# Patient Record
Sex: MALE | Race: WHITE
[De-identification: names, ages, dates, MRNs, and addresses within clinical notes are randomized per-mention and may not be internally consistent; named-entity substitution may affect disease eponyms.]

---

## 2017-04-06 ENCOUNTER — HOSPITAL ENCOUNTER (EMERGENCY)
Dept: HOSPITAL 62 - ER | Age: 37
LOS: 1 days | Discharge: HOME | End: 2017-04-07
Payer: OTHER GOVERNMENT

## 2017-04-06 DIAGNOSIS — Z88.0: ICD-10-CM

## 2017-04-06 DIAGNOSIS — Z88.2: ICD-10-CM
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
## 2017-04-06 DIAGNOSIS — Z88.8: ICD-10-CM
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
## 2017-04-06 DIAGNOSIS — S06.0X0A: Primary | ICD-10-CM
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
## 2017-04-06 DIAGNOSIS — Z88.1: ICD-10-CM

## 2017-04-06 DIAGNOSIS — Z88.5: ICD-10-CM

## 2017-04-06 PROCEDURE — 70450 CT HEAD/BRAIN W/O DYE: CPT

## 2017-04-06 PROCEDURE — 70486 CT MAXILLOFACIAL W/O DYE: CPT

## 2017-04-06 PROCEDURE — 99284 EMERGENCY DEPT VISIT MOD MDM: CPT

## 2017-04-07 VITALS — DIASTOLIC BLOOD PRESSURE: 81 MMHG | SYSTOLIC BLOOD PRESSURE: 120 MMHG

## 2017-04-07 NOTE — ER DOCUMENT REPORT
ED General





- General


Chief Complaint: Head Injury


Stated Complaint: HEAD INJURY


Notes: 


Patient is a 36-year-old male presents with complaint of being hit in head with 

base wall.  He was pitching to player hit a line drive and hit him directly in 

the left side of face and temple portion of his head.  He did not black out but 

became very days.  He's been holding compression and ice to the area since and.

  Hurts to open his left jaw.  He had some blurred type vision.  He is not on 

blood thinners.  No other injuries.  No neck pain.  No other complaints at this 

time.





TRAVEL OUTSIDE OF THE [place] IN LAST 30 DAYS: No





- Related Data


Allergies/Adverse Reactions: 


 





ketorolac tromethamine [From Toradol] Allergy (Verified 04/06/17 23:21)


 


mesalamine [From Pentasa] Allergy (Verified 04/06/17 23:21)


 


metronidazole [From Flagyl] Allergy (Verified 04/06/17 23:21)


 


Metronidazole HCl [From Flagyl] Allergy (Verified 04/06/17 23:21)


 


morphine [Morphine] Allergy (Verified 04/06/17 23:21)


 


Penicillins Allergy (Verified 04/06/17 23:21)


 


propranolol [Propranolol] Allergy (Verified 04/06/17 23:21)


 


Sulfa (Sulfonamide Antibiotics) Allergy (Verified 04/06/17 23:21)


 


tramadol [Tramadol] Allergy (Verified 04/06/17 23:21)


 











Past Medical History





- Social History


Smoking Status: Never Smoker


Frequency of alcohol use: None


Drug Abuse: None


Family History: Reviewed & Not Pertinent


Patient has suicidal ideation: No


Patient has homicidal ideation: No


Renal/ Medical History: Reports: Hx Kidney Stones.  Denies: Hx Peritoneal 

Dialysis


GI Medical History: Reports: Hx Crohn's Disease, Hx Gastroesophageal Reflux 

Disease, Hx Hiatal Hernia


Past Surgical History: Reports: Hx Abdominal Surgery, Hx Bowel Surgery, Hx 

Cholecystectomy, Hx Rectal Surgery





- Immunizations


Hx Diphtheria, Pertussis, Tetanus Vaccination: Yes





Review of Systems





- Review of Systems


Notes: 


My Normal Review Basic





REVIEW OF SYSTEMS:


CONSTITUTIONAL :  Denies fever,  chills, or sweats.  Denies recent illness.


EENT: Pain to left side of face.


GASTROINTESTINAL:  Denies abdominal pain.  Denies nausea, vomiting, or 

diarrhea.  Denies constipation.  Last BM: 


MUSCULOSKELETAL:  Denies neck or back pain or joint pain or swelling.


SKIN:   Denies rash or skin lesions.


NEUROLOGICAL:  Denies altered mental status or loss of consciousness.  Has a 

headache.  Denies weakness or paralysis or loss of use of either side.  Denies 

problems with gait or speech.  Denies sensory or motor loss.


ALL OTHER SYSTEMS REVIEWED AND NEGATIVE.





Physical Exam





- Vital signs


Vitals: 


 











Temp Pulse Resp BP Pulse Ox


 


 98.4 F   116 H  18   127/90 H  100 


 


 04/06/17 23:13  04/06/17 23:13  04/06/17 23:13  04/06/17 23:13  04/06/17 23:13














- Notes


Notes: 


General Appearance: Well nourished, alert, cooperative, no acute distress, 

moderate obvious discomfort.


Vitals: reviewed, See vital signs table.


Head: no swelling or tenderness to the head


Eyes: PERRL, EOMI, Conjuctiva clear


Mouth: No decreasd moisture


Throat: No tonsillar inflammation, No airway obstruction,  No lymphadenopathy


Neck: Supple, no neck tenderness, 


Extremities: strength 5/5 in all extremities, good pulses in all extremities, 

no swelling or tenderness in the extremities, no edema.


Skin: warm, dry, appropriate color, no rash


Neuro: speech clear, oriented x 3, normal affect, responds appropriately to 

questions.  Cranial nerves II through XII are intact.  Distal sensation intact.

  Patient moves all extremities without difficulty.





Course





- Vital Signs


Vital signs: 


 











Temp Pulse Resp BP Pulse Ox


 


 97.9 F   81   16   120/81   97 


 


 04/07/17 02:38  04/07/17 02:38  04/07/17 02:38  04/07/17 02:38  04/07/17 02:38














- Transfer of Care


Notes: 


04/07/17 05:59


CT scan showed no evidence of skull fracture bleeding in the brain or facial 

fracture.  I informed patient most likely suffered concussion.  We'll have him 

avoid any type of activities that could potentially and physical contact for at 

least 2 weeks.  Patient encouraged return to ER immediately if he has severe 

headache, vomiting, or feels unwell.  Patient agrees with plan and will be 

discharged home.





Dictation of this chart was performed using voice recognition software; 

therefore, there may be some unintended grammatical errors.





04/07/17 05:59








Discharge





- Discharge


Clinical Impression: 


Concussion


Qualifiers:


 Encounter type: initial encounter Loss of consciousness presence/duration: 

without LOC Qualified Code(s): S06.0X0A - Concussion without loss of 

consciousness, initial encounter





Condition: Good


Disposition: HOME, SELF-CARE


Instructions:  Oral Narcotic Medication ([facility])


Additional Instructions: 


Concussion





     You have suffered a concussion -- a temporary loss of certain brain 

functions due to a mild brain injury.  The recovery is usually rapid and 

complete.  The temporary problems occurring with a concussion can include loss 

of consciousness, dizziness, nausea, vomiting, and confusion.


     Repeat concussions can cause brain damage. In the future, avoid activities 

that will cause a blow to your head. Wear a helmet for sports such as 

snowboarding, biking, or skating.


     It's important that someone be with you for the first 24 hours. During 

this time, do not exercise or drive a vehicle.  Do not take any pain medication 

stronger than acetaminophen unless prescribed by the physician.  Any 

significant changes should be reported immediately to the physician.  Signs of 

a problem may include:





     (1) Mental confusion


     (2) Incoordination or staggering


     (3) Repeated or forceful vomiting


     (4) Clear or bloody drainage from ear, mouth, or nose


     (5) Severe headache, not relieved by acetaminophen or prescribed pain 

medication


     (6) Failure to improve in 24 hours








Please return to the ER immediately if you have intractable vomiting, worsening 

headache, or severe sleepiness. Please follow up closely with your doctor for 

reevaluation on Monday. Avoid any activities that could cause potential contact 

or trauma to your head for at least 2 weeks.


Referrals: 


SARITHA HOLT MD [Primary Care Provider] - 04/10/17

## 2017-10-14 ENCOUNTER — HOSPITAL ENCOUNTER (EMERGENCY)
Dept: HOSPITAL 62 - ER | Age: 37
LOS: 1 days | Discharge: HOME | End: 2017-10-15
Payer: MEDICARE

## 2017-10-14 DIAGNOSIS — Z88.0: ICD-10-CM

## 2017-10-14 DIAGNOSIS — Z88.8: ICD-10-CM

## 2017-10-14 DIAGNOSIS — Z48.815: Primary | ICD-10-CM

## 2017-10-14 DIAGNOSIS — Z90.49: ICD-10-CM

## 2017-10-14 DIAGNOSIS — Z88.5: ICD-10-CM

## 2017-10-14 DIAGNOSIS — Z88.2: ICD-10-CM

## 2017-10-14 DIAGNOSIS — Z88.1: ICD-10-CM

## 2017-10-14 LAB
BASOPHILS # BLD AUTO: 0.1 10^3/UL (ref 0–0.2)
BASOPHILS NFR BLD AUTO: 1.4 % (ref 0–2)
EOSINOPHIL # BLD AUTO: 0.1 10^3/UL (ref 0–0.6)
EOSINOPHIL NFR BLD AUTO: 1 % (ref 0–6)
ERYTHROCYTE [DISTWIDTH] IN BLOOD BY AUTOMATED COUNT: 12.4 % (ref 11.5–14)
HCT VFR BLD CALC: 37.3 % (ref 37.9–51)
HGB BLD-MCNC: 13 G/DL (ref 13.5–17)
HGB HCT DIFFERENCE: 1.7
LYMPHOCYTES # BLD AUTO: 1.9 10^3/UL (ref 0.5–4.7)
LYMPHOCYTES NFR BLD AUTO: 20.3 % (ref 13–45)
MCH RBC QN AUTO: 30.5 PG (ref 27–33.4)
MCHC RBC AUTO-ENTMCNC: 34.8 G/DL (ref 32–36)
MCV RBC AUTO: 88 FL (ref 80–97)
MONOCYTES # BLD AUTO: 0.8 10^3/UL (ref 0.1–1.4)
MONOCYTES NFR BLD AUTO: 8.2 % (ref 3–13)
NEUTROPHILS # BLD AUTO: 6.5 10^3/UL (ref 1.7–8.2)
NEUTS SEG NFR BLD AUTO: 69.1 % (ref 42–78)
PROTHROMBIN TIME: 12.1 SEC (ref 11.4–15.4)
RBC # BLD AUTO: 4.26 10^6/UL (ref 4.35–5.55)
WBC # BLD AUTO: 9.4 10^3/UL (ref 4–10.5)

## 2017-10-14 PROCEDURE — 87086 URINE CULTURE/COLONY COUNT: CPT

## 2017-10-14 PROCEDURE — 82803 BLOOD GASES ANY COMBINATION: CPT

## 2017-10-14 PROCEDURE — 93005 ELECTROCARDIOGRAM TRACING: CPT

## 2017-10-14 PROCEDURE — 99284 EMERGENCY DEPT VISIT MOD MDM: CPT

## 2017-10-14 PROCEDURE — 81001 URINALYSIS AUTO W/SCOPE: CPT

## 2017-10-14 PROCEDURE — 85025 COMPLETE CBC W/AUTO DIFF WBC: CPT

## 2017-10-14 PROCEDURE — 85610 PROTHROMBIN TIME: CPT

## 2017-10-14 PROCEDURE — 36415 COLL VENOUS BLD VENIPUNCTURE: CPT

## 2017-10-14 PROCEDURE — 93010 ELECTROCARDIOGRAM REPORT: CPT

## 2017-10-14 PROCEDURE — 80053 COMPREHEN METABOLIC PANEL: CPT

## 2017-10-14 PROCEDURE — 82962 GLUCOSE BLOOD TEST: CPT

## 2017-10-14 PROCEDURE — 83605 ASSAY OF LACTIC ACID: CPT

## 2017-10-14 PROCEDURE — 74177 CT ABD & PELVIS W/CONTRAST: CPT

## 2017-10-15 VITALS — SYSTOLIC BLOOD PRESSURE: 118 MMHG | DIASTOLIC BLOOD PRESSURE: 72 MMHG

## 2017-10-15 LAB
ALBUMIN SERPL-MCNC: 4.5 G/DL (ref 3.5–5)
ALP SERPL-CCNC: 228 U/L (ref 38–126)
ALT SERPL-CCNC: 74 U/L (ref 21–72)
ANION GAP SERPL CALC-SCNC: 15 MMOL/L (ref 5–19)
APPEARANCE UR: CLEAR
AST SERPL-CCNC: 39 U/L (ref 17–59)
BILIRUB DIRECT SERPL-MCNC: 0.4 MG/DL (ref 0–0.4)
BILIRUB SERPL-MCNC: 0.4 MG/DL (ref 0.2–1.3)
BILIRUB UR QL STRIP: NEGATIVE
BUN SERPL-MCNC: 12 MG/DL (ref 7–20)
CALCIUM: 10.5 MG/DL (ref 8.4–10.2)
CHLORIDE SERPL-SCNC: 102 MMOL/L (ref 98–107)
CO2 SERPL-SCNC: 24 MMOL/L (ref 22–30)
CREAT SERPL-MCNC: 0.92 MG/DL (ref 0.52–1.25)
GLUCOSE SERPL-MCNC: 94 MG/DL (ref 75–110)
GLUCOSE UR STRIP-MCNC: NEGATIVE MG/DL
KETONES UR STRIP-MCNC: NEGATIVE MG/DL
NITRITE UR QL STRIP: NEGATIVE
PH UR STRIP: 5 [PH] (ref 5–9)
POTASSIUM SERPL-SCNC: 5 MMOL/L (ref 3.6–5)
PROT SERPL-MCNC: 7.5 G/DL (ref 6.3–8.2)
PROT UR STRIP-MCNC: NEGATIVE MG/DL
SODIUM SERPL-SCNC: 141.2 MMOL/L (ref 137–145)
SP GR UR STRIP: 1.02
UROBILINOGEN UR-MCNC: NEGATIVE MG/DL (ref ?–2)

## 2017-10-15 NOTE — RADIOLOGY REPORT (SQ)
EXAM DESCRIPTION:  CT ABD/PELVIS WITH IV   ORAL



COMPLETED DATE/TIME:  10/15/2017 1:48 am



REASON FOR STUDY:  post op pain, superficial drainage



COMPARISON:  3/14/2016



TECHNIQUE:  CT scan of the abdomen and pelvis performed with intravenous and oral contrast using lakshmi
shaina scanning technique with dynamic intravenous contrast injection. Images reviewed with lung, soft t
issue, and bone windows. Reconstructed coronal and sagittal MPR images reviewed. Delayed images for e
valuation of the urinary system also acquired. All images stored on PACS.

All CT scanners at this facility use dose modulation, iterative reconstruction, and/or weight based d
osing when appropriate to reduce radiation dose to as low as reasonably achievable (ALARA).

[facility]: Dose Right  [facility]: CareDose    [facility]: Dose Right    [facility]: Teradose 4D    [facility]: Smart Technologies



CONTRAST TYPE AND DOSE:  contrast/concentration: Isovue 370.00 mg/ml; Total Contrast Delivered: 94.0 
ml; Total Saline Delivered: 71.0 ml



RENAL FUNCTION:  GFR > 60.



RADIATION DOSE:  Up-to-date CT equipment and radiation dose reduction techniques were employed. CTDIv
ol: 12.0 - 16.1 mGy. DLP: 1585 mGy-cm. .



LIMITATIONS:  None.



FINDINGS:  LOWER CHEST: Basilar atelectasis.

LIVER: Normal size. No masses.  No dilated ducts.

SPLEEN: Normal size. No focal lesions.

PANCREAS: No masses. No significant calcifications. No adjacent inflammation or peripancreatic fluid 
collections. Pancreatic duct not dilated.

GALLBLADDER: No identified stones by CT criteria. No inflammatory changes to suggest cholecystitis.

ADRENAL GLANDS: No significant masses or asymmetry.

RIGHT KIDNEY AND URETER: No solid masses.   No significant calcifications.   No hydronephrosis or hyd
roureter.

LEFT KIDNEY AND URETER: No solid masses.   No significant calcifications.   No hydronephrosis or hydr
oureter.

AORTA AND VESSELS: No aneurysm. No dissection. Renal arteries, SMA, celiac without stenosis.

RETROPERITONEUM: No retroperitoneal adenopathy, hemorrhage or masses.

BOWEL AND PERITONEAL CAVITY: Colectomy.  Left lower quadrant ostomy.  Mild adynamic ileus.  No visual
ized abscess.

APPENDIX: Surgically absent.

PELVIS: No significant masses.  Normal bladder. No free fluid.

ABDOMINAL WALL: Midline surgical incision.  No fluid collection.

BONES: No significant or acute findings.

OTHER: No other significant finding.



IMPRESSION:  Postsurgical changes with colectomy.  Left lower quadrant ostomy.  No abscess.  Mild julisa
namic ileus.

Midline surgical incision without focal fluid collection.



TECHNICAL DOCUMENTATION:  JOB ID:  [id number]

Quality ID # [id number]: Final reports with documentation of one or more dose reduction techniques (e.g., Au
tomated exposure control, adjustment of the mA and/or kV according to patient size, use of iterative 
reconstruction technique)

 2011 [employer]- All Rights Reserved

## 2017-10-15 NOTE — ER DOCUMENT REPORT
ED General





- General


Chief Complaint: Wound Infection


Stated Complaint: POST SURGERY PROBLEMS


Time Seen by Provider: 10/14/17 23:41


Notes: 





Patient is a 37-year-old male who presents emergency department  for wound 

check.  Patient states that he had  surgery on September 27 for reversal of 

previous ileoanal ostomy.  Patient states that he has a history of Crohn's 

disease that is previously required total colectomy, loop ileostomy, ileoanal 

anastomosis with recent reversal for an ileostomy.  Patient states that he was 

in the hospital for approximately 2 weeks with noted complication of ileus.  

Otherwise he states that he was discharged home to do wet-to-dry dressings of 

his abdominal incision that had evidence of wound dehiscence.  Patient states 

that he has been doing wet-to-dry dressings every 12 hours.  States this 

evening she had an area that he was concerned was pus.  Otherwise he denies any 

fevers, chills, nausea, vomiting, abdominal pain.


TRAVEL OUTSIDE OF THE [place] IN LAST 30 DAYS: No





- Related Data


Allergies/Adverse Reactions: 


 





ketorolac tromethamine [From Toradol] Allergy (Verified 04/06/17 23:21)


 


mesalamine [From Pentasa] Allergy (Verified 04/06/17 23:21)


 


metronidazole [From Flagyl] Allergy (Verified 04/06/17 23:21)


 


Metronidazole HCl [From Flagyl] Allergy (Verified 04/06/17 23:21)


 


morphine [Morphine] Allergy (Verified 04/06/17 23:21)


 


Penicillins Allergy (Verified 04/06/17 23:21)


 


propranolol [Propranolol] Allergy (Verified 04/06/17 23:21)


 


Sulfa (Sulfonamide Antibiotics) Allergy (Verified 04/06/17 23:21)


 


tramadol [Tramadol] Allergy (Verified 04/06/17 23:21)


 











Past Medical History





- Social History


Smoking Status: Never Smoker


Family History: Reviewed & Not Pertinent


Patient has suicidal ideation: No


Patient has homicidal ideation: No


Renal/ Medical History: Reports: Hx Kidney Stones.  Denies: Hx Peritoneal 

Dialysis


GI Medical History: Reports: Hx Crohn's Disease, Hx Gastroesophageal Reflux 

Disease, Hx Hiatal Hernia


Past Surgical History: Reports: Hx Abdominal Surgery, Hx Bowel Surgery, Hx 

Cholecystectomy, Hx Rectal Surgery





- Immunizations


Hx Diphtheria, Pertussis, Tetanus Vaccination: Yes





Review of Systems





- Review of Systems


Constitutional: No symptoms reported


Cardiovascular: No symptoms reported


Respiratory: No symptoms reported


Gastrointestinal: See HPI





Physical Exam





- Vital signs


Vitals: 


 











Temp Pulse Resp BP Pulse Ox


 


 98.7 F   90   20   134/80 H  95 


 


 10/14/17 22:39  10/14/17 22:39  10/14/17 22:39  10/14/17 22:39  10/14/17 22:39














- Notes


Notes: 





PHYSICAL EXAM


GENERAL: Alert, interacts well. 


HEAD: Normocephalic, atraumatic.


EYES: Pupils equal, round, and reactive to light. Extraocular movements intact.


ENT: Oral mucosa moist, tongue midline. 


NECK: Full range of motion. Supple. Trachea midline.


LUNGS: Clear to auscultation bilaterally, no wheezes, rales, or rhonchi. No 

respiratory distress.


HEART: Regular rate and rhythm. No murmurs, gallops, or rubs.


ABDOMEN: Soft,  nondistended, nontender. No guarding, rebound, or rigidity.. 

Bowel sounds present in all 4 quadrants.


EXTREMITIES: Moves all 4 extremities spontaneously. No edema, radial and 

dorsalis pedis pulses 2/4 bilaterally. No cyanosis. 


NEUROLOGICAL: Alert and oriented x4. Normal speech.


PSYCH: Normal affect, normal mood.


SKIN: Warm, dry, normal turgor.  Midline abdominal laparotomy incision with 

staples and central to the incision is an area approximately 3 cm long and wide 

with packing.  Approximately 3 cm cephalic to this area is an area lateral to 

the incision that when pressed has clear serous fluid.  No surrounding erythema

, induration.








Course





- Re-evaluation


Re-evalutation: 





10/15/17 03:47


Patient is a 37-year-old male who is hemodynamically stable, no acute distress 

afebrile.  Given previous abdominal surgeries, complications and presentation 

this evening CT scan was ordered to rule out any concern for deeper abscess.  

There is no evidence of this on scan.  Discussion with resident on-call [facility] agrees with plan for repacking of the site and to withhold 

antibiotics given no signs of fever, elevated white blood cell count or 

cellulitis.  2 staples removed from the incision to allow for continued 

drainage of this area.  Patient tolerated the procedure well.  Stable for 

discharge home and to touch base with the surgeon's office on Monday.





- Vital Signs


Vital signs: 


 











Temp Pulse Resp BP Pulse Ox


 


 98.7 F   79   16   118/72   95 


 


 10/15/17 05:21  10/15/17 05:21  10/15/17 05:21  10/15/17 05:21  10/15/17 05:21














- Laboratory


Result Diagrams: 


 10/14/17 23:46





 10/14/17 23:46


Laboratory results interpreted by me: 


 











  10/14/17 10/14/17 10/15/17





  23:46 23:46 01:20


 


RBC  4.26 L  


 


Hgb  13.0 L  


 


Hct  37.3 L  


 


Plt Count  495 H  


 


Calcium   10.5 H 


 


ALT   74 H 


 


Alkaline Phosphatase   228 H 


 


Urine Blood    SMALL H














- Diagnostic Test


Radiology reviewed: Image reviewed, Reports reviewed





Discharge





- Discharge


Clinical Impression: 


 Visit for wound check





Condition: Good


Disposition: HOME, SELF-CARE


Additional Instructions: 


Please continue to care for your wound as you have been instructed by [facility].


Please be sure to touch base with your surgeon on Monday


Referrals: 


JUNAID JAMES NP [Primary Care Provider] - Follow up as needed

## 2020-12-23 ENCOUNTER — HOSPITAL ENCOUNTER (EMERGENCY)
Dept: HOSPITAL 62 - ER | Age: 40
LOS: 1 days | Discharge: ANOTHER HOSPITAL | End: 2020-12-24
Payer: MEDICARE

## 2020-12-23 DIAGNOSIS — R10.84: ICD-10-CM

## 2020-12-23 DIAGNOSIS — Z79.899: ICD-10-CM

## 2020-12-23 DIAGNOSIS — Z88.1: ICD-10-CM

## 2020-12-23 DIAGNOSIS — Z88.6: ICD-10-CM

## 2020-12-23 DIAGNOSIS — Z88.2: ICD-10-CM

## 2020-12-23 DIAGNOSIS — R11.0: ICD-10-CM

## 2020-12-23 DIAGNOSIS — K50.812: Primary | ICD-10-CM

## 2020-12-23 DIAGNOSIS — Z20.828: ICD-10-CM

## 2020-12-23 DIAGNOSIS — Z88.8: ICD-10-CM

## 2020-12-23 DIAGNOSIS — K76.0: ICD-10-CM

## 2020-12-23 DIAGNOSIS — Z72.0: ICD-10-CM

## 2020-12-23 DIAGNOSIS — Z93.2: ICD-10-CM

## 2020-12-23 DIAGNOSIS — K43.3: ICD-10-CM

## 2020-12-23 DIAGNOSIS — Z88.0: ICD-10-CM

## 2020-12-23 DIAGNOSIS — Z88.5: ICD-10-CM

## 2020-12-23 DIAGNOSIS — R10.817: ICD-10-CM

## 2020-12-23 LAB
ADD MANUAL DIFF: NO
ALBUMIN SERPL-MCNC: 4.3 G/DL (ref 3.5–5)
ALP SERPL-CCNC: 72 U/L (ref 38–126)
ANION GAP SERPL CALC-SCNC: 10 MMOL/L (ref 5–19)
APPEARANCE UR: CLEAR
APTT PPP: YELLOW S
AST SERPL-CCNC: 42 U/L (ref 17–59)
BASOPHILS # BLD AUTO: 0.1 10^3/UL (ref 0–0.2)
BASOPHILS NFR BLD AUTO: 0.6 % (ref 0–2)
BILIRUB DIRECT SERPL-MCNC: 0.1 MG/DL (ref 0–0.4)
BILIRUB SERPL-MCNC: 0.5 MG/DL (ref 0.2–1.3)
BILIRUB UR QL STRIP: NEGATIVE
BUN SERPL-MCNC: 10 MG/DL (ref 7–20)
CALCIUM: 9.7 MG/DL (ref 8.4–10.2)
CHLORIDE SERPL-SCNC: 105 MMOL/L (ref 98–107)
CO2 SERPL-SCNC: 23 MMOL/L (ref 22–30)
EOSINOPHIL # BLD AUTO: 0.1 10^3/UL (ref 0–0.6)
EOSINOPHIL NFR BLD AUTO: 0.6 % (ref 0–6)
ERYTHROCYTE [DISTWIDTH] IN BLOOD BY AUTOMATED COUNT: 13.6 % (ref 11.5–14)
GLUCOSE SERPL-MCNC: 104 MG/DL (ref 75–110)
GLUCOSE UR STRIP-MCNC: NEGATIVE MG/DL
HCT VFR BLD CALC: 43.9 % (ref 37.9–51)
HGB BLD-MCNC: 15.2 G/DL (ref 13.5–17)
KETONES UR STRIP-MCNC: NEGATIVE MG/DL
LYMPHOCYTES # BLD AUTO: 1.5 10^3/UL (ref 0.5–4.7)
LYMPHOCYTES NFR BLD AUTO: 15.6 % (ref 13–45)
MCH RBC QN AUTO: 30.4 PG (ref 27–33.4)
MCHC RBC AUTO-ENTMCNC: 34.6 G/DL (ref 32–36)
MCV RBC AUTO: 88 FL (ref 80–97)
MONOCYTES # BLD AUTO: 0.7 10^3/UL (ref 0.1–1.4)
MONOCYTES NFR BLD AUTO: 6.8 % (ref 3–13)
NEUTROPHILS # BLD AUTO: 7.4 10^3/UL (ref 1.7–8.2)
NEUTS SEG NFR BLD AUTO: 76.4 % (ref 42–78)
NITRITE UR QL STRIP: NEGATIVE
PH UR STRIP: 5 [PH] (ref 5–9)
PLATELET # BLD: 195 10^3/UL (ref 150–450)
POTASSIUM SERPL-SCNC: 3.9 MMOL/L (ref 3.6–5)
PROT SERPL-MCNC: 7.1 G/DL (ref 6.3–8.2)
PROT UR STRIP-MCNC: NEGATIVE MG/DL
RBC # BLD AUTO: 4.99 10^6/UL (ref 4.35–5.55)
SP GR UR STRIP: 1.03
TOTAL CELLS COUNTED % (AUTO): 100 %
UROBILINOGEN UR-MCNC: NEGATIVE MG/DL (ref ?–2)
WBC # BLD AUTO: 9.6 10^3/UL (ref 4–10.5)

## 2020-12-23 PROCEDURE — 80053 COMPREHEN METABOLIC PANEL: CPT

## 2020-12-23 PROCEDURE — C9803 HOPD COVID-19 SPEC COLLECT: HCPCS

## 2020-12-23 PROCEDURE — 83690 ASSAY OF LIPASE: CPT

## 2020-12-23 PROCEDURE — 96376 TX/PRO/DX INJ SAME DRUG ADON: CPT

## 2020-12-23 PROCEDURE — 81001 URINALYSIS AUTO W/SCOPE: CPT

## 2020-12-23 PROCEDURE — 85025 COMPLETE CBC W/AUTO DIFF WBC: CPT

## 2020-12-23 PROCEDURE — 0241U: CPT

## 2020-12-23 PROCEDURE — 96361 HYDRATE IV INFUSION ADD-ON: CPT

## 2020-12-23 PROCEDURE — 36415 COLL VENOUS BLD VENIPUNCTURE: CPT

## 2020-12-23 PROCEDURE — 74177 CT ABD & PELVIS W/CONTRAST: CPT

## 2020-12-23 PROCEDURE — 96375 TX/PRO/DX INJ NEW DRUG ADDON: CPT

## 2020-12-23 PROCEDURE — 99285 EMERGENCY DEPT VISIT HI MDM: CPT

## 2020-12-23 PROCEDURE — 96374 THER/PROPH/DIAG INJ IV PUSH: CPT

## 2020-12-23 NOTE — ER DOCUMENT REPORT
ED General





- General


Chief Complaint: Abdominal Pain


Stated Complaint: LOWER ABDOMINAL PAIN


Time Seen by Provider: 12/23/20 18:22


Primary Care Provider: 


JUNAID JAMES NP [NURSE PRACTITIONER] - Follow up as needed


Notes: 





40-year-old male with history of Crohn's disease with 28 prior abdominal 

surgeries for multiple ostomies and reversals and ultimately end ileostomy 

presents with severe abdominal pain diffusely started approximately 6 hours 

prior to arrival associated with nausea and no output from ostomy bag.  Patient 

denies black stool, bloody stool, vomiting, fever, chest pain, trauma, 

dizziness, syncope


TRAVEL OUTSIDE OF THE [place] IN LAST 30 DAYS: No





- Related Data


Allergies/Adverse Reactions: 


                                        





ketorolac tromethamine [From Toradol] Allergy (Verified 04/06/17 23:21)


   


mesalamine [From Pentasa] Allergy (Verified 04/06/17 23:21)


   


metronidazole [From Flagyl] Allergy (Verified 04/06/17 23:21)


   


Metronidazole HCl [From Flagyl] Allergy (Verified 04/06/17 23:21)


   


morphine [Morphine] Allergy (Verified 04/06/17 23:21)


   


Penicillins Allergy (Verified 04/06/17 23:21)


   


propranolol [Propranolol] Allergy (Verified 04/06/17 23:21)


   


Sulfa (Sulfonamide Antibiotics) Allergy (Verified 04/06/17 23:21)


   


tramadol [Tramadol] Allergy (Verified 04/06/17 23:21)


   








Home Medications: vit D and B 12 injection every 2 weeks





Past Medical History





- General


Information source: Patient





- Social History


Smoking Status: Former Smoker


Chew tobacco use (# tins/day): Yes


Frequency of alcohol use: None


Drug Abuse: None


Family History: Reviewed & Not Pertinent


Renal/ Medical History: Reports: Hx Kidney Stones.  Denies: Hx Peritoneal 

Dialysis


GI Medical History: Reports: Hx Crohn's Disease, Hx Gastroesophageal Reflux 

Disease, Hx Hiatal Hernia


Past Surgical History: Reports: Hx Abdominal Surgery, Hx Bowel Surgery, Hx 

Cholecystectomy, Hx Rectal Surgery





- Immunizations


Hx Diphtheria, Pertussis, Tetanus Vaccination: Yes





Review of Systems





- Review of Systems


Notes: 





REVIEW OF SYSTEMS:


CONSTITUTIONAL :  Denies fever,  chills, or sweats. 


EENT: Denies recent cold/sinus symptoms, denies throat pain


CARDIOVASCULAR:  Denies chest pain, VIRIDIANA


RESPIRATORY:  Denies cough, denies shortness of breath.


GASTROINTESTINAL:  + abdominal pain, +nausea


GENITOURINARY:  Denies difficulty urinating, painful urination.


MUSCULOSKELETAL:  Denies neck pain, back pain.


SKIN:   Denies rash or skin lesions.


HEMATOLOGIC :   Denies easy bruising or bleeding.


LYMPHATIC:  Denies swollen, enlarged glands.


NEUROLOGICAL:  Denies headache, denies change in gait.


PSYCHIATRIC:  Denies anxiety or stress or depression.





Physical Exam





- Vital signs


Vitals: 





                                        











Temp Pulse Resp BP Pulse Ox


 


 98.2 F   94   24 H  140/84 H  100 


 


 12/23/20 18:34  12/23/20 18:34  12/23/20 18:34  12/23/20 18:34  12/23/20 18:34














- Notes


Notes: 





PHYSICAL EXAMINATION:


 


GENERAL: Uncomfortable but nontoxic-appearing young adult man in no acute 

distress


 


HEAD: Atraumatic, normocephalic.


 


EYES: Pupils equal round and appropriate constriction, sclera anicteric, 

conjunctiva are normal.


 


ENT: nares patent, moist mucous membranes.


 


NECK: Normal range of motion, supple without lymphadenopathy


 


LUNGS: Breath sounds clear to auscultation bilaterally and equal.  No wheezes 

rales or rhonchi.


 


HEART: Regular rate and rhythm without murmurs


 


ABDOMEN: Soft, diffuse tenderness without guarding or rebound, lower mid 

abdominal ostomy bag with low volume brown stool and no gas in bag, no visible 

blood


 


EXTREMITIES: Normal range of motion, no pitting or edema.  No cyanosis.


 


NEUROLOGICAL: Awake, alert, conversing appropriately, moves all extremities 

spontaneously.


 


PSYCH: Normal mood, normal affect.


 


SKIN: Warm, Dry, normal turgor, no rashes or lesions noted.





Course





- Re-evaluation


Re-evalutation: 





12/23/20 23:56


Presentation concerning for possible bowel obstruction versus strangulated or 

incarcerated hernia versus recurrent Crohn's symptoms.  Patient with normal 

vital signs, nontoxic appearance, obtain CT abdomen pelvis which showed small 

bowel obstruction.  Given patient's significant surgical history with 28+ prior 

abdominal surgeries at [facility] I contacted [facility] for transfer for continuity of care 

and the patient was accepted to the surgical service by Dr. Dong.  I placed NG

 tube which successfully drained approximately 500 mL of tan/clear fluid without

 complication.  Started patient on maintenance fluids, gave pain and nausea 

control.  Patient transferred here at this time to take patient to [facility] where he 

has a bed assignment, patient remains appropriate for transfer at this time.


12/23/20 23:59


At 11:45 PM I placed a 14 French nasogastric tube to 30 cm at the nose after 

Afrin spray and lidocaine 1% without lidocaine 6 mL nebulizer treatment without 

complication with drainage of 500 mL of tan/clear fluid, patient tolerated 

procedure well.





- Vital Signs


Vital signs: 





                                        











Temp Pulse Resp BP Pulse Ox


 


 97.8 F   68   17   121/78   100 


 


 12/23/20 22:46  12/23/20 22:46  12/23/20 22:46  12/23/20 22:46  12/23/20 22:46














- Laboratory Results


Result Diagrams: 


                                 12/23/20 19:59





                                 12/23/20 19:59


Laboratory Results Interpreted: 





                                        











  12/23/20





  19:59


 


ALT  83 H











Critical Laboratory Results Reviewed: No Critical Results





- Radiology Results


Critical Radiology Results Reviewed: Yes


Attending or Supervising Physician who Reviewed Radiology: LISE GUZMAN





Discharge





- Discharge


Clinical Impression: 


 Small bowel obstruction





Crohn's disease


Qualifiers:


 Gastrointestinal tract location: small and large intestine Digestive disease 

complication type: with intestinal obstruction Qualified Code(s): K50.812 - 

Crohn's disease of both small and large intestine with intestinal obstruction





Disposition: [place]


Referrals: 


JUNAID JAMES NP [NURSE PRACTITIONER] - Follow up as needed

## 2020-12-23 NOTE — RADIOLOGY REPORT (SQ)
EXAM DESCRIPTION:

CT ABD/PELVIS WITH IV ONLY



CLINICAL HISTORY: 

40 years  Male;  abd pain, hx IBD 



TECHNIQUE: 

CT of the abdomen and pelvis with intravenous contrast.. Oral

contrastWas not used. Delayed imaging of the abdomen and pelvis

was performed.

All CT scans at this facility use dose modulation, iterative

reconstruction, and/or weight based dosing when appropriate to

reduce radiation dose to as low as reasonably achievable.



This exam was performed according to our department optimization

program which includes automated exposure control, adjustment of

the mA and/or kv according to patient size and/or use of

iterative reconstruction technique.



COMPARISON: CT scan of the abdomen and pelvis October 15, 2017.



FINDINGS:

Lower chest:The lung bases are clear. The visualized portion of

heart and great vessels are normal.



Abdomen: 

Liver and biliary tree: Diffuse fatty infiltration of the liver

is identified. Portal vein and hepatic veins are patent. The

gallbladder is unremarkable.

Pancreas: Normal

Spleen:Within normal limits

Kidneys:  Kidneys are normal in size, shape and position.  No

stones.  No mass or hydronephrosis. Symmetric renal enhancement.

On delayed images there is symmetric contrast excretion from the

kidneys bilaterally. The ureters are normal.

Adrenal glands:Within normal limits

Vascular structures:Within normal limits

Retroperitoneum:  No mass or lymphadenopathy

Abdominal wall:  A left lower quadrant colostomy is identified

adjacent to the colostomy is a parastomal hernia containing a

loop of small bowel which is dilated. The parastomal hernia is

new when compared the previous exam. The bowel both proximal and

distal to the small bowel herniated loop is dilated. The distal

small bowel and the residual colon is decompressed. Findings are

consistent with small bowel obstruction. The transition point

appears to be in the left upper quadrant and is not clearly

related to the hernia.

GI: Postsurgical change of abdominal perineal resection with left

lower quadrant colostomy. There is a parastomal hernia containing

a dilated loop of small bowel. Proximal small bowel is dilated in

the distal small bowel and colon are decompressed. The transition

zone is in the left upper quadrant and may be related to

adhesions or an internal hernia. There are scattered diverticula

in the colon. No diverticulitis. Appendix: The appendix is not

clearly seen.

General:  No free air.  No free fluid



Pelvis: 

Lymph nodes:  No mass or lymphadenopathy

Bladder: Unremarkable.

Pelvis: No pelvic mass or adenopathy.

Bones: No acute bone findings.

  

IMPRESSION:



1. Postsurgical change of abdominal perineal resection with left

lower quadrant colostomy.

2. Interval development of a left parastomal hernia containing a

dilated loop of small bowel.

3. Abnormal bowel gas pattern consistent with small bowel

obstruction. The transition zone is in the left upper quadrant of

the abdomen and may be related to adhesions or internal hernia.

3. Fatty infiltration of the liver.

## 2020-12-23 NOTE — ER DOCUMENT REPORT
ED Medical Screen (RME)





- General


Chief Complaint: Abdominal Pain


Stated Complaint: LOWER ABDOMINAL PAIN


Time Seen by Provider: 12/23/20 18:22


Primary Care Provider: 


JUNAID JAMES NP [Primary Care Provider] - Follow up as needed


Notes: 





Patient presents complaining of abdominal pain that started around 230 today 

after eating a sandwich.  Patient reports nausea vomiting x4 episodes.  Patient 

states that pain is worse surrounding his colostomy although complains of 

generalized abdominal pain.  Patient reports a history of numerous abdominal 

surgeries including hernia repair, appendectomy, total colectomy with colostomy.

 Patient denies any fever.  Patient reports ostomy drainage is thicker than it 

usually appears.





I have greeted and performed a rapid initial assessment of this patient.  A 

comprehensive ED assessment and evaluation of the patient, analysis of test 

results and completion of the medical decision making process will be conducted 

by additional ED providers.


TRAVEL OUTSIDE OF THE [place] IN LAST 30 DAYS: No





- Related Data


Allergies/Adverse Reactions: 


                                        





ketorolac tromethamine [From Toradol] Allergy (Verified 04/06/17 23:21)


   


mesalamine [From Pentasa] Allergy (Verified 04/06/17 23:21)


   


metronidazole [From Flagyl] Allergy (Verified 04/06/17 23:21)


   


Metronidazole HCl [From Flagyl] Allergy (Verified 04/06/17 23:21)


   


morphine [Morphine] Allergy (Verified 04/06/17 23:21)


   


Penicillins Allergy (Verified 04/06/17 23:21)


   


propranolol [Propranolol] Allergy (Verified 04/06/17 23:21)


   


Sulfa (Sulfonamide Antibiotics) Allergy (Verified 04/06/17 23:21)


   


tramadol [Tramadol] Allergy (Verified 04/06/17 23:21)


   








Home Medications: vit D and B 12 injection every 2 weeks





Past Medical History





- Social History


Chew tobacco use (# tins/day): Yes


Frequency of alcohol use: None


Drug Abuse: None


Renal/ Medical History: Reports: Hx Kidney Stones.  Denies: Hx Peritoneal 

Dialysis


GI Medical History: Reports: Hx Crohn's Disease, Hx Gastroesophageal Reflux 

Disease, Hx Hiatal Hernia


Past Surgical History: Reports: Hx Abdominal Surgery, Hx Bowel Surgery, Hx 

Cholecystectomy, Hx Rectal Surgery





- Immunizations


Hx Diphtheria, Pertussis, Tetanus Vaccination: Yes





Physical Exam





- General


General appearance: Alert, Anxious


Notes: 





Patient restless, repositioning, patient guards with movement





Doctor's Discharge





- Discharge


Referrals: 


JUNAID JAMES, NP [Primary Care Provider] - Follow up as needed

## 2020-12-24 VITALS — DIASTOLIC BLOOD PRESSURE: 80 MMHG | SYSTOLIC BLOOD PRESSURE: 129 MMHG
